# Patient Record
(demographics unavailable — no encounter records)

---

## 2024-11-15 NOTE — HISTORY OF PRESENT ILLNESS
[FreeTextEntry1] : HISTORY:  17 y/o female presents as follow up for SLE.  Pt reports joint pains, stiffness, swelling in b/l hands, then knees and L ankle x 1 month. Pt advised to take OTC Tylenol and NSAIDs without much improvement.  Pt started to get rashes of b/l hands (with dryness) and face as well x 1 month.  Reports general hair loss  Denies sicca symptoms, oral ulcers, Raynaud's, GI, pulm symptoms  No family Hx of autoimmune diseases.   Pt has inflammatory polyarthritis including small joints of hands and rashes of face and hands in setting of positive SHERWIN, dsDNA, SSA, SSB, Neff, RNP, anemia (likely anemia of chronic inflammation), low complements. Patient is diagnosed with SLE by me.  Pt has started HCQ since 3/2024 with significant improvement in her joint pains/stiffness but with no major improvement of her skin rash.   INTERVAL HISTORY:  Pt has been on Benlysta since 8/2024 with initial resolution of facial rash but has been returning (not nearly as badly as prior to Benlysta) over the last few weeks with colder temperature. Pt also has been noticing more inflammatory arthritis of hands. Pt continues on HCQ 300mg/day. Pt was seen by ophthalmology 10/2024. Discussed that we would like to treat current flare but would like to watch longer on current therapy before considering additional long term DMARDs for SLE.  WORKUP:  Remarkable for (3/2024): Hgb 10.0 (MCV 66.6), AST 48, ALT 52, ESR 36, SHERWIN+, dsDNA 23, SSA++, SSB++, Neff++, RNP++, C3 65, C4 6, RF 18  Normal/neg (3/2024): CRP, UPCR, CCP, Kamilla-1, CPK, iron panel, ASO, RPR, Parvovirus IgG+/IgM-, EBV, Lyme

## 2024-11-15 NOTE — PHYSICAL EXAM
[TextEntry] : GENERAL: Appears in no acute distress HEENT: EOMI, PERRLA. No conjunctival erythema. Moist mucous membranes. No nasopharyngeal ulcers NECK: Supple, no cervical lymphadenopathy, no thyromegaly CARDIOVASCULAR: RRR. S1, S2 auscultated. No murmurs or rubs. PULMONARY: Clear to auscultation b/l, no wheezes, rales, or crackles ABDOMINAL: Soft, nontender, nondistended. Bowel sounds present. No organomegaly. MSK: Tenderness to palpation of scattered PIPs, b/l knees Normal ROM of neck, back, b/l upper and lower extremities. SKIN: Improved but still presents plaques of b/l face (nose, eyebrows, ears) NEURO: No focal deficits. PSYCH: AAOx3. Normal affect and thought process.

## 2024-11-15 NOTE — ASSESSMENT
[FreeTextEntry1] : 19 y/o female presents as follow up for SLE.  Pt reports joint pains, stiffness, swelling in b/l hands, then knees and L ankle x 1 month. Pt advised to take OTC Tylenol and NSAIDs without much improvement.  Pt started to get rashes of b/l hands (with dryness) and face as well x 1 month.  Reports general hair loss  Denies sicca symptoms, oral ulcers, Raynaud's, GI, pulm symptoms  No family Hx of autoimmune diseases.   Pt has inflammatory polyarthritis including small joints of hands and rashes of face and hands in setting of positive SHERWIN, dsDNA, SSA, SSB, Neff, RNP, anemia (likely anemia of chronic inflammation), low complements. Patient is diagnosed with SLE by me.  Pt has started HCQ since 3/2024 with significant improvement in her joint pains/stiffness but with no major improvement of her skin rash. Pt was started on Benlysta in 8/2024 with initial resolution of facial rash but has been returning (not nearly as badly as prior to Benlysta) over the last few weeks with colder temperature. Pt also has been noticing more inflammatory arthritis of hands. Discussed that we would like to treat current flare but would like to watch longer on current therapy before considering additional long term DMARDs for SLE.  - Will obtain safety labs, disease activity labs on next visit  - Rx PDN 20mg x 3-7 day course PRN for current SLE flare with skin and joint involvement. - c/w Benlysta SQ QWeekly. We discussed the possible side effects including immunosuppression and need for monitoring for risk of infection, need for monitoring cell counts as well as liver and kidney function, as well as possible GI upset.  - Discussed that there are no safety data of Benlysta on pregnancy (although pt has no plans for pregnancy at this time) and that it must be discontinued for any pregnancy planning or conception.  - c/w HCQ 300mg PO daily. HCQ dosage is <5mg/kg/day or <400mg/day to minimize risk of retinal toxicity.  Side effects of HCQ were discussed with the patient including but not limited to GI upset, retinal toxicity, QT prolongation, cytopenias, myopathy. Discussed the importance of yearly ophthalmology evaluation.  - Pt was seen by ophthalmology for HCQ screening in 10/2024. - Last Hep B/C, QTB negative 3/2024.  - RTC 3 months for follow up.

## 2025-02-07 NOTE — ASSESSMENT
[FreeTextEntry1] : 20 y/o female presents as follow up for SLE.  Pt reports joint pains, stiffness, swelling in b/l hands, then knees and L ankle x 1 month. Pt advised to take OTC Tylenol and NSAIDs without much improvement.  Pt started to get rashes of b/l hands (with dryness) and face as well x 1 month.  Reports general hair loss  Denies sicca symptoms, oral ulcers, Raynaud's, GI, pulm symptoms  No family Hx of autoimmune diseases.   Pt has inflammatory polyarthritis including small joints of hands and rashes of face and hands in setting of positive SHERWIN, dsDNA, SSA, SSB, Neff, RNP, anemia (likely anemia of chronic inflammation), low complements. Patient is diagnosed with SLE by me.  Pt has started HCQ since 3/2024 with significant improvement in her joint pains/stiffness but with no major improvement of her skin rash. Pt was started on Benlysta in 8/2024 with initial resolution of facial rash but has significantly returns with joint inflammation as well.  Pt has no plans for pregnancy and is currently willing to add any medication to help control her disease.  - Obtain safety labs, disease activity labs on next visit  - Rx MMF 500mg BID. Discussed with patient possible side effects of cyclophosphamide including increased risk of infection, cytopenias, viral infections, acute renal failure. Counseled to avoid pregnancy while on MMF. This is a high-risk therapy requiring intense monitoring for toxicity. Will evaluate with frequent monitoring of Cr and CBC. - c/w PDN 20mg x 3-7 day course PRN for current SLE flare with skin and joint involvement.  - c/w Benlysta SQ QWeekly. We discussed the possible side effects including immunosuppression and need for monitoring for risk of infection, need for monitoring cell counts as well as liver and kidney function, as well as possible GI upset.  - Discussed that there are no safety data of Benlysta on pregnancy (although pt has no plans for pregnancy at this time) and that it must be discontinued for any pregnancy planning or conception.  - c/w HCQ 300mg PO daily. HCQ dosage is <5mg/kg/day or <400mg/day to minimize risk of retinal toxicity.  Side effects of HCQ were discussed with the patient including but not limited to GI upset, retinal toxicity, QT prolongation, cytopenias, myopathy. Discussed the importance of yearly ophthalmology evaluation.  - Pt was seen by ophthalmology for HCQ screening in 10/2024.  - Last Hep B/C, QTB negative 3/2024. Repeat today. - Other options for treatment include increasing MMF, AZA, changing Benlysta to Saphnelo, RTX - RTC 2 months for follow up.

## 2025-02-07 NOTE — PHYSICAL EXAM
[TextEntry] : GENERAL: Appears in no acute distress HEENT: EOMI, PERRLA. No conjunctival erythema. Moist mucous membranes. No nasopharyngeal ulcers NECK: Supple, no cervical lymphadenopathy, no thyromegaly CARDIOVASCULAR: RRR. S1, S2 auscultated. No murmurs or rubs. PULMONARY: Clear to auscultation b/l, no wheezes, rales, or crackles ABDOMINAL: Soft, nontender, nondistended. Bowel sounds present. No organomegaly. MSK: Tenderness to palpation of scattered PIPs, b/l knees Normal ROM of neck, back, b/l upper and lower extremities. SKIN: Significant erythematous plaques of b/l face (nose, eyebrows, ears) NEURO: No focal deficits. PSYCH: AAOx3. Normal affect and thought process.

## 2025-02-07 NOTE — HISTORY OF PRESENT ILLNESS
[FreeTextEntry1] : HISTORY:  18 y/o female presents as follow up for SLE.  Pt reports joint pains, stiffness, swelling in b/l hands, then knees and L ankle x 1 month. Pt advised to take OTC Tylenol and NSAIDs without much improvement.  Pt started to get rashes of b/l hands (with dryness) and face as well x 1 month.  Reports general hair loss  Denies sicca symptoms, oral ulcers, Raynaud's, GI, pulm symptoms  No family Hx of autoimmune diseases.   Pt has inflammatory polyarthritis including small joints of hands and rashes of face and hands in setting of positive SHERWIN, dsDNA, SSA, SSB, Neff, RNP, anemia (likely anemia of chronic inflammation), low complements. Patient is diagnosed with SLE by me.  Pt has started HCQ since 3/2024 with significant improvement in her joint pains/stiffness but with no major improvement of her skin rash. Pt was started on Benlysta in 8/2024 with initial resolution of facial rash but has been returning (not nearly as badly as prior to Benlysta) over the last few weeks with colder temperature. Pt also has been noticing more inflammatory arthritis of hands.  Discussed that we would like to treat current flare but would like to watch longer on current therapy before considering additional long term DMARDs for SLE.   INTERVAL HISTORY:  Pt has been on Benlysta QWeekly, HCQ. Pt reports continues significant joint pains and rashes which has been worse that they have ever been. Pt has no plans for pregnancy and is currently willing to add any medication to help control her disease.  WORKUP:  Remarkable for (3/2024): Hgb 10.0 (MCV 66.6), AST 48, ALT 52, ESR 36, SHERWIN+, dsDNA 23, SSA++, SSB++, Neff++, RNP++, C3 65, C4 6, RF 18  Normal/neg (3/2024): CRP, UPCR, CCP, Kamilla-1, CPK, iron panel, ASO, RPR, Parvovirus IgG+/IgM-, EBV, Lyme

## 2025-04-07 NOTE — HISTORY OF PRESENT ILLNESS
[FreeTextEntry1] : HISTORY:  18 y/o female presents as follow up for SLE.  Pt reports joint pains, stiffness, swelling in b/l hands, then knees and L ankle x 1 month. Pt advised to take OTC Tylenol and NSAIDs without much improvement.  Pt started to get rashes of b/l hands (with dryness) and face as well x 1 month.  Reports general hair loss  Denies sicca symptoms, oral ulcers, Raynaud's, GI, pulm symptoms  No family Hx of autoimmune diseases.   Pt has inflammatory polyarthritis including small joints of hands and rashes of face and hands in setting of positive SHERWIN, dsDNA, SSA, SSB, Neff, RNP, anemia (likely anemia of chronic inflammation), low complements. Patient is diagnosed with SLE by me.  Pt has started HCQ since 3/2024 with significant improvement in her joint pains/stiffness but with no major improvement of her skin rash. Pt was started on Benlysta in 8/2024 with initial resolution of facial rash but has significantly returns with joint inflammation as well.   Pt has no plans for pregnancy and is currently willing to add any medication to help control her disease.   INTERVAL HISTORY:  Pt here for 2 months follow up. Pt has been on MMF 500mg BID since 2/2025. Reports about 75% improvement of facial rash and less frequent joint pain flares. Denies any side effects. Pt continues on HCQ 300mg/day, Benlysta. After discussion, pt willing to increase MMF further to see if further effect.  WORKUP:  Remarkable for (3/2024): Hgb 10.0 (MCV 66.6), AST 48, ALT 52, ESR 36, SHERWIN+, dsDNA 23, SSA++, SSB++, Neff++, RNP++, C3 65, C4 6, RF 18  Normal/neg (3/2024): CRP, UPCR, CCP, Kamilla-1, CPK, iron panel, ASO, RPR, Parvovirus IgG+/IgM-, EBV, Lyme

## 2025-04-07 NOTE — ASSESSMENT
[FreeTextEntry1] : 18 y/o female presents as follow up for SLE.  Pt reports joint pains, stiffness, swelling in b/l hands, then knees and L ankle x 1 month. Pt advised to take OTC Tylenol and NSAIDs without much improvement.  Pt started to get rashes of b/l hands (with dryness) and face as well x 1 month.  Reports general hair loss  Denies sicca symptoms, oral ulcers, Raynaud's, GI, pulm symptoms  No family Hx of autoimmune diseases.   Pt has inflammatory polyarthritis including small joints of hands and rashes of face and hands in setting of positive SHERWIN, dsDNA, SSA, SSB, Neff, RNP, anemia (likely anemia of chronic inflammation), low complements. Patient is diagnosed with SLE by me.  Pt has started HCQ since 3/2024 with significant improvement in her joint pains/stiffness but with no major improvement of her skin rash. Pt was started on Benlysta in 8/2024 with initial resolution of facial rash but has significantly returns with joint inflammation as well.   Pt has no plans for pregnancy and is currently willing to add any medication to help control her disease.  Pt was started on MMF 500mg BID by me since 2/2025. Reports about 75% improvement of facial rash and less frequent joint pain flares. After discussion, pt willing to increase MMF further to see if further effect.  - Obtain safety labs, disease activity labs. - Increase MMF 500mg ->> 1g BID. Discussed with patient possible side effects of cyclophosphamide including increased risk of infection, cytopenias, viral infections, acute renal failure.  Counseled to avoid pregnancy while on MMF.  This is a high-risk therapy requiring intense monitoring for toxicity. Will evaluate with frequent monitoring of Cr and CBC.  - c/w PDN 20mg x 3-7 day course PRN for current SLE flare with skin and joint involvement.  - c/w Benlysta SQ QWeekly. We discussed the possible side effects including immunosuppression and need for monitoring for risk of infection, need for monitoring cell counts as well as liver and kidney function, as well as possible GI upset.  - Discussed that there are no safety data of Benlysta on pregnancy (although pt has no plans for pregnancy at this time) and that it must be discontinued for any pregnancy planning or conception.  - c/w HCQ 300mg PO daily. HCQ dosage is <5mg/kg/day or <400mg/day to minimize risk of retinal toxicity.  Side effects of HCQ were discussed with the patient including but not limited to GI upset, retinal toxicity, QT prolongation, cytopenias, myopathy. Discussed the importance of yearly ophthalmology evaluation.  - Pt was seen by ophthalmology for HCQ screening in 10/2024.  - Last Hep B/C, QTB negative 2/2025  - Other options for treatment include increasing MMF, AZA, changing Benlysta to Saphnelo, RTX  - If patient ever plans on pregnancy: Stop MMF >6 weeks prior to conception, stop Benlysta at conception, continue HCQ throughout pregnancy. - RTC 2 months for follow up.